# Patient Record
Sex: FEMALE | Race: WHITE | NOT HISPANIC OR LATINO | Employment: FULL TIME | URBAN - NONMETROPOLITAN AREA
[De-identification: names, ages, dates, MRNs, and addresses within clinical notes are randomized per-mention and may not be internally consistent; named-entity substitution may affect disease eponyms.]

---
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
## 2019-03-12 ENCOUNTER — HOSPITAL ENCOUNTER (OUTPATIENT)
Dept: LAB | Facility: MEDICAL CENTER | Age: 19
End: 2019-03-12
Attending: OBSTETRICS & GYNECOLOGY
Payer: COMMERCIAL

## 2019-03-12 LAB — PROGEST SERPL-MCNC: 14.67 NG/ML

## 2019-03-12 PROCEDURE — 36415 COLL VENOUS BLD VENIPUNCTURE: CPT

## 2019-03-12 PROCEDURE — 84144 ASSAY OF PROGESTERONE: CPT

## 2019-11-02 ENCOUNTER — NON-PROVIDER VISIT (OUTPATIENT)
Dept: URGENT CARE | Facility: PHYSICIAN GROUP | Age: 19
End: 2019-11-02

## 2019-11-02 DIAGNOSIS — Z11.1 ENCOUNTER FOR PPD TEST: ICD-10-CM

## 2019-11-02 PROCEDURE — 86580 TB INTRADERMAL TEST: CPT | Performed by: FAMILY MEDICINE

## 2019-11-04 ENCOUNTER — NON-PROVIDER VISIT (OUTPATIENT)
Dept: URGENT CARE | Facility: PHYSICIAN GROUP | Age: 19
End: 2019-11-04

## 2019-11-04 LAB — TB WHEAL 3D P 5 TU DIAM: 0 MM

## 2020-02-12 ENCOUNTER — NON-PROVIDER VISIT (OUTPATIENT)
Dept: URGENT CARE | Facility: PHYSICIAN GROUP | Age: 20
End: 2020-02-12

## 2020-02-12 DIAGNOSIS — Z11.1 ENCOUNTER FOR PPD TEST: ICD-10-CM

## 2020-02-12 PROCEDURE — 86580 TB INTRADERMAL TEST: CPT | Performed by: PHYSICIAN ASSISTANT

## 2020-02-12 NOTE — NON-PROVIDER
Juvenal Jin is a 19 y.o. female here for a non-provider visit for PPD placement -- Step 1 of 1    Reason for PPD:  work requirement    1. TB evaluation questionnaire completed by patient? Yes      -  If any answers marked yes did you contact a provider prior to placing? No  2.  Patient notified to return to clinic for reading on: 2/14-2/15  3.  PPD Placement documentation completed on TB evaluation questionnaire? Yes  4.  Location of TB evaluation questionnaire filed: epic

## 2020-02-14 ENCOUNTER — NON-PROVIDER VISIT (OUTPATIENT)
Dept: URGENT CARE | Facility: PHYSICIAN GROUP | Age: 20
End: 2020-02-14

## 2020-02-14 LAB — TB WHEAL 3D P 5 TU DIAM: 0 MM

## 2021-05-10 ENCOUNTER — OFFICE VISIT (OUTPATIENT)
Dept: URGENT CARE | Facility: PHYSICIAN GROUP | Age: 21
End: 2021-05-10
Payer: MEDICAID

## 2021-05-10 VITALS
WEIGHT: 182 LBS | SYSTOLIC BLOOD PRESSURE: 114 MMHG | TEMPERATURE: 98.6 F | HEART RATE: 60 BPM | BODY MASS INDEX: 31.07 KG/M2 | DIASTOLIC BLOOD PRESSURE: 60 MMHG | HEIGHT: 64 IN | RESPIRATION RATE: 12 BRPM | OXYGEN SATURATION: 98 %

## 2021-05-10 DIAGNOSIS — H10.9 BACTERIAL CONJUNCTIVITIS OF RIGHT EYE: ICD-10-CM

## 2021-05-10 PROCEDURE — 99203 OFFICE O/P NEW LOW 30 MIN: CPT | Performed by: NURSE PRACTITIONER

## 2021-05-10 RX ORDER — POLYMYXIN B SULFATE AND TRIMETHOPRIM 1; 10000 MG/ML; [USP'U]/ML
1 SOLUTION OPHTHALMIC EVERY 4 HOURS
Qty: 10 ML | Refills: 0 | Status: SHIPPED | OUTPATIENT
Start: 2021-05-10

## 2021-05-10 ASSESSMENT — ENCOUNTER SYMPTOMS
DIZZINESS: 0
DOUBLE VISION: 0
HEADACHES: 0
BLURRED VISION: 0
VOMITING: 0
NAUSEA: 0

## 2021-05-10 ASSESSMENT — VISUAL ACUITY: OU: 1

## 2021-05-10 NOTE — PROGRESS NOTES
"Subjective:   Juvenal Jin is a 21 y.o. female who presents for Eye Problem (right)      HPI  21 year old female in urgent care for new onset right eye pain, drainage since this morning. She denies vision changes, dizziness, nausea or vomiting.  She is using cool water compresses to clean out her eye. Patient is also 33 weeks pregnant    Review of Systems   Eyes: Negative for blurred vision and double vision.   Gastrointestinal: Negative for nausea and vomiting.   Neurological: Negative for dizziness and headaches.   All other systems reviewed and are negative.      Patient Active Problem List   Diagnosis   • Nausea     History reviewed. No pertinent surgical history.  Social History     Tobacco Use   • Smoking status: Current Some Day Smoker   • Smokeless tobacco: Never Used   Substance Use Topics   • Alcohol use: No     Alcohol/week: 0.0 oz   • Drug use: No      History reviewed. No pertinent family history.   (Allergies, Medications, & Tobacco/Substance Use were reconciled by the Medical Assistant and reviewed by myself. The family history is prepopulated)     Objective:     /60 (BP Location: Right arm, Patient Position: Sitting, BP Cuff Size: Adult)   Pulse 60   Temp 37 °C (98.6 °F) (Temporal)   Resp 12   Ht 1.626 m (5' 4\")   Wt 82.6 kg (182 lb)   SpO2 98%   BMI 31.24 kg/m²     Physical Exam  Vitals reviewed.   Constitutional:       Appearance: Normal appearance.   Eyes:      General: Vision grossly intact. Gaze aligned appropriately.      Conjunctiva/sclera:      Right eye: Right conjunctiva is injected. Exudate present.   Pulmonary:      Effort: Pulmonary effort is normal.   Skin:     General: Skin is warm.   Neurological:      Mental Status: She is alert and oriented to person, place, and time.   Psychiatric:         Mood and Affect: Mood normal.         Behavior: Behavior normal.         Thought Content: Thought content normal.         Judgment: Judgment normal.         Assessment/Plan:     1. " Bacterial conjunctivitis of right eye  polymixin-trimethoprim (POLYTRIM) 31518-7.1 UNIT/ML-% Solution     Discussed physical examination findings as well as patient presentation, length patient symptoms is consistent with bacterial conjunctivitis and will be treated with antibiotic eyedrops at the right eye.  Advised to continue to use cool water compresses avoid eye make-up and increase hand hygiene.      Differential diagnosis, natural history, supportive care, and indications for immediate follow-up discussed.    Advised the patient to follow-up with the primary care physician for recheck, reevaluation, and consideration of further management.    Please note that this dictation was created using voice recognition software. I have made a reasonable attempt to correct obvious errors, but I expect that there are errors of grammar and possibly content that I did not discover before finalizing the note.    This note was electronically signed THONY Camp

## 2021-09-02 ENCOUNTER — HOSPITAL ENCOUNTER (OUTPATIENT)
Facility: MEDICAL CENTER | Age: 21
End: 2021-09-02
Attending: PHYSICIAN ASSISTANT
Payer: COMMERCIAL

## 2021-09-02 ENCOUNTER — OFFICE VISIT (OUTPATIENT)
Dept: URGENT CARE | Facility: CLINIC | Age: 21
End: 2021-09-02

## 2021-09-02 VITALS
HEART RATE: 74 BPM | SYSTOLIC BLOOD PRESSURE: 114 MMHG | BODY MASS INDEX: 29.59 KG/M2 | HEIGHT: 63 IN | RESPIRATION RATE: 18 BRPM | DIASTOLIC BLOOD PRESSURE: 78 MMHG | WEIGHT: 167 LBS | OXYGEN SATURATION: 98 % | TEMPERATURE: 98.3 F

## 2021-09-02 DIAGNOSIS — Z02.89 ENCOUNTER FOR PHYSICAL EXAMINATION RELATED TO EMPLOYMENT: ICD-10-CM

## 2021-09-02 DIAGNOSIS — Z02.1 PRE-EMPLOYMENT DRUG SCREENING: ICD-10-CM

## 2021-09-02 DIAGNOSIS — Z02.89 ENCOUNTER FOR OCCUPATIONAL HEALTH ASSESSMENT: ICD-10-CM

## 2021-09-02 LAB
AMP AMPHETAMINE: NORMAL
COC COCAINE: NORMAL
INT CON NEG: NORMAL
INT CON POS: NORMAL
MET METHAMPHETAMINES: NORMAL
OPI OPIATES: NORMAL
PCP PHENCYCLIDINE: NORMAL
POC DRUG COMMENT 753798-OCCUPATIONAL HEALTH: NEGATIVE
THC: NORMAL

## 2021-09-02 PROCEDURE — 36415 COLL VENOUS BLD VENIPUNCTURE: CPT | Performed by: PHYSICIAN ASSISTANT

## 2021-09-02 PROCEDURE — 99000 SPECIMEN HANDLING OFFICE-LAB: CPT | Performed by: PHYSICIAN ASSISTANT

## 2021-09-02 PROCEDURE — 80305 DRUG TEST PRSMV DIR OPT OBS: CPT | Performed by: PHYSICIAN ASSISTANT

## 2021-09-02 PROCEDURE — 86480 TB TEST CELL IMMUN MEASURE: CPT | Performed by: PHYSICIAN ASSISTANT

## 2021-09-02 PROCEDURE — 8915 PR COMPREHENSIVE PHYSICAL: Performed by: PHYSICIAN ASSISTANT

## 2021-09-07 LAB
GAMMA INTERFERON BACKGROUND BLD IA-ACNC: 0.31 IU/ML
M TB IFN-G BLD-IMP: NEGATIVE
M TB IFN-G CD4+ BCKGRND COR BLD-ACNC: -0.05 IU/ML
MITOGEN IGNF BCKGRD COR BLD-ACNC: >10 IU/ML
QFT TB2 - NIL TBQ2: -0.09 IU/ML

## 2023-03-09 ENCOUNTER — OFFICE VISIT (OUTPATIENT)
Dept: URGENT CARE | Facility: PHYSICIAN GROUP | Age: 23
End: 2023-03-09
Payer: COMMERCIAL

## 2023-03-09 VITALS
BODY MASS INDEX: 27.86 KG/M2 | HEART RATE: 101 BPM | RESPIRATION RATE: 18 BRPM | HEIGHT: 64 IN | DIASTOLIC BLOOD PRESSURE: 78 MMHG | WEIGHT: 163.2 LBS | OXYGEN SATURATION: 98 % | SYSTOLIC BLOOD PRESSURE: 118 MMHG | TEMPERATURE: 98.2 F

## 2023-03-09 DIAGNOSIS — R11.2 NAUSEA AND VOMITING, UNSPECIFIED VOMITING TYPE: ICD-10-CM

## 2023-03-09 PROCEDURE — 99213 OFFICE O/P EST LOW 20 MIN: CPT | Performed by: NURSE PRACTITIONER

## 2023-03-09 RX ORDER — ONDANSETRON 4 MG/1
4 TABLET, ORALLY DISINTEGRATING ORAL EVERY 6 HOURS PRN
Qty: 15 TABLET | Refills: 0 | Status: SHIPPED | OUTPATIENT
Start: 2023-03-09

## 2023-03-09 RX ORDER — ONDANSETRON 4 MG/1
4 TABLET, ORALLY DISINTEGRATING ORAL EVERY 6 HOURS PRN
Qty: 15 TABLET | Refills: 0 | Status: SHIPPED | OUTPATIENT
Start: 2023-03-09 | End: 2023-03-09

## 2023-03-09 ASSESSMENT — ENCOUNTER SYMPTOMS
NUMBER OF EPISODES OF EMESIS TODAY: 1
NAUSEA: 1
MUSCULOSKELETAL NEGATIVE: 1
FEVER: 0
VOMITING: 1
EYES NEGATIVE: 1
CARDIOVASCULAR NEGATIVE: 1
RESPIRATORY NEGATIVE: 1
PSYCHIATRIC NEGATIVE: 1
NEUROLOGICAL NEGATIVE: 1
CHILLS: 1

## 2023-03-09 NOTE — LETTER
March 9, 2023       Patient: Juvenal Jin   YOB: 2000   Date of Visit: 3/9/2023         To Whom It May Concern:    In my medical opinion, I recommend that Juvenal Jin be excused from work 3/8/2023 through 3/11/2023 due to illness.     If you have any questions or concerns, please don't hesitate to call [phone]          Sincerely,          ALBINO VelezRAidaN.  Electronically Signed

## 2023-03-09 NOTE — PROGRESS NOTES
"Subjective:   Juvenal Jin is a 23 y.o. female who presents for Nausea, Emesis, and Dizziness (Started last night around 5 pm tom then by 9 pm pt states she started vomiting.)      Nausea  This is a new problem. The current episode started yesterday. The problem occurs constantly. The problem has been unchanged. Associated symptoms include chills, nausea and vomiting. Pertinent negatives include no fever. The symptoms are aggravated by drinking and eating. She has tried rest and sleep for the symptoms.   Emesis  This is a new problem. The current episode started yesterday. The problem occurs constantly. The problem has been unchanged. Associated symptoms include chills, nausea and vomiting. Pertinent negatives include no fever. The symptoms are aggravated by drinking and eating. She has tried nothing for the symptoms. The treatment provided mild relief.     Review of Systems   Constitutional:  Positive for chills. Negative for fever.   HENT: Negative.     Eyes: Negative.    Respiratory: Negative.     Cardiovascular: Negative.    Gastrointestinal:  Positive for nausea and vomiting.   Genitourinary: Negative.    Musculoskeletal: Negative.    Skin: Negative.    Neurological: Negative.    Psychiatric/Behavioral: Negative.       Medications, Allergies, and current problem list reviewed today in Epic.     Objective:     /78   Pulse (!) 101   Temp 36.8 °C (98.2 °F) (Temporal)   Resp 18   Ht 1.626 m (5' 4\")   Wt 74 kg (163 lb 3.2 oz)   SpO2 98%     Physical Exam  Vitals reviewed.   Constitutional:       Appearance: Normal appearance.   HENT:      Head: Normocephalic and atraumatic.      Nose: Nose normal.      Mouth/Throat:      Mouth: Mucous membranes are moist.      Pharynx: Oropharynx is clear.   Eyes:      Extraocular Movements: Extraocular movements intact.      Conjunctiva/sclera: Conjunctivae normal.      Pupils: Pupils are equal, round, and reactive to light.   Cardiovascular:      Rate and Rhythm: " "Normal rate and regular rhythm.   Pulmonary:      Effort: Pulmonary effort is normal.      Breath sounds: Normal breath sounds.   Abdominal:      General: Abdomen is flat.      Palpations: Abdomen is soft.   Musculoskeletal:         General: Normal range of motion.      Cervical back: Normal range of motion and neck supple.   Skin:     General: Skin is warm and dry.      Capillary Refill: Capillary refill takes less than 2 seconds.   Neurological:      General: No focal deficit present.      Mental Status: She is alert.   Psychiatric:         Mood and Affect: Mood normal.         Behavior: Behavior normal.       Assessment/Plan:     Diagnosis and associated orders:     1. Nausea and vomiting, unspecified vomiting type  ondansetron (ZOFRAN ODT) 4 MG TABLET DISPERSIBLE         Comments/MDM:     Gastroenteritis   Gastroenteritis is an illness of the intestines. It is sometimes called \"stomach flu\". It causes nausea, vomiting, stomach cramps, watery poop (diarrhea) and a slight fever. It is usually caused by a virus. This illness often clears up in 4-5 days. However, it can be serious when people who lose too much fluid from throwing up (vomiting) and/or diarrhea. When too much fluid is lost and has not been replaced, it is called dehydration.   HOME CARE   Wash your hands a lot.   Rest.   Drink fluids slowly. Drinking too much or too fast can cause you to throw up.   Drink \"oral rehydration fluids\" (ORS) as directed. They can be bought in a grocery store or pharmacy. Ask your doctor or pharmacist how to take the ORS if you do not understand.   Do not eat too much at once.   Avoid tobacco, alcohol and drugs that upset your stomach.   BRAT diet start eating bananas, rice, apples and dry toast   GET HELP RIGHT AWAY IF:   You become weak, dizzy, or faint.   You cannot keep fluids down.   You have a dry mouth, no tears and pee less. These are signs of dehydration.   Belly (abdominal) pain starts, feels worse, or stays in " one place.   You or your child has a fever above 102º F (38.9º C) for more than 1 day.   Diarrhea has blood or mucous in it.   You become confused.   After 5-7 days, you are still throwing up and/or having diarrhea.   MAKE SURE YOU:   Understand these instructions.   Will watch your condition.   Will get help right away if you are not doing well or get worse.   Document Released: 06/05/2009 Document Re-Released: 11/30/2009   [employer]® Patient Information ©2011 [employer].           Differential diagnosis, natural history, supportive care, and indications for immediate follow-up discussed.    Advised the patient to follow-up with the primary care physician for recheck, reevaluation, and consideration of further management.    Please note that this dictation was created using voice recognition software. I have made a reasonable attempt to correct obvious errors, but I expect that there are errors of grammar and possibly content that I did not discover before finalizing the note.    This note was electronically signed by THONY Iqbal

## 2023-04-19 ENCOUNTER — OFFICE VISIT (OUTPATIENT)
Dept: URGENT CARE | Facility: PHYSICIAN GROUP | Age: 23
End: 2023-04-19
Payer: COMMERCIAL

## 2023-04-19 VITALS
TEMPERATURE: 96.8 F | DIASTOLIC BLOOD PRESSURE: 76 MMHG | OXYGEN SATURATION: 98 % | RESPIRATION RATE: 14 BRPM | HEIGHT: 64 IN | HEART RATE: 77 BPM | BODY MASS INDEX: 27.83 KG/M2 | SYSTOLIC BLOOD PRESSURE: 112 MMHG | WEIGHT: 163 LBS

## 2023-04-19 DIAGNOSIS — J02.9 PHARYNGITIS, UNSPECIFIED ETIOLOGY: ICD-10-CM

## 2023-04-19 DIAGNOSIS — Z3A.01 LESS THAN 8 WEEKS GESTATION OF PREGNANCY: ICD-10-CM

## 2023-04-19 LAB — S PYO DNA SPEC NAA+PROBE: NOT DETECTED

## 2023-04-19 PROCEDURE — 99213 OFFICE O/P EST LOW 20 MIN: CPT

## 2023-04-19 PROCEDURE — 87651 STREP A DNA AMP PROBE: CPT

## 2023-04-19 ASSESSMENT — ENCOUNTER SYMPTOMS
HEMOPTYSIS: 0
SORE THROAT: 1
SHORTNESS OF BREATH: 0
SINUS PAIN: 0
SPUTUM PRODUCTION: 0
WHEEZING: 0
CHILLS: 0
STRIDOR: 0
COUGH: 1
FEVER: 0

## 2023-04-19 NOTE — PROGRESS NOTES
Subjective:   Juvenal Jin is a 23 y.o. female who presents for Pharyngitis (Red and swollen x 3 days )      HPI: This is a 23-year-old female who presents today for sore throat.  This is a new problem.  Patient reports developing sore throat 2 days ago.  She reports pain is 6\10.  He reports associated pain with swallowing, runny nose, and mild cough.  She has not take anything for her symptoms.  Patient does report that she is 6 weeks pregnant.  No fevers, chills, body aches.  No sick contacts.  No history of strep in the past.      Review of Systems   Constitutional:  Negative for chills, fever and malaise/fatigue.   HENT:  Positive for sore throat. Negative for congestion, ear discharge, ear pain and sinus pain.    Respiratory:  Positive for cough. Negative for hemoptysis, sputum production, shortness of breath, wheezing and stridor.    All other systems reviewed and are negative.    Medications:    Current Outpatient Medications on File Prior to Visit   Medication Sig Dispense Refill    ondansetron (ZOFRAN ODT) 4 MG TABLET DISPERSIBLE Take 1 Tablet by mouth every 6 hours as needed for Nausea/Vomiting for up to 15 doses. (Patient not taking: Reported on 4/19/2023) 15 Tablet 0    polymixin-trimethoprim (POLYTRIM) 62381-7.1 UNIT/ML-% Solution Administer 1 Drop into the right eye every 4 hours. (Patient not taking: Reported on 9/2/2021) 10 mL 0    calcium carbonate (TUMS) 500 MG Chew Tab Take 500 mg by mouth every day. (Patient not taking: Reported on 9/2/2021)       No current facility-administered medications on file prior to visit.        Allergies:   Patient has no known allergies.    Problem List:   Patient Active Problem List   Diagnosis    Nausea        Surgical History:  No past surgical history on file.    Past Social Hx:   Social History     Tobacco Use    Smoking status: Some Days    Smokeless tobacco: Never   Substance Use Topics    Alcohol use: No     Alcohol/week: 0.0 oz    Drug use: No     "      Problem list, medications, and allergies reviewed by myself today in Epic.     Objective:     /76   Pulse 77   Temp 36 °C (96.8 °F) (Temporal)   Resp 14   Ht 1.626 m (5' 4\")   Wt 73.9 kg (163 lb)   SpO2 98%   BMI 27.98 kg/m²     Physical Exam  Vitals and nursing note reviewed.   Constitutional:       General: She is not in acute distress.     Appearance: Normal appearance. She is normal weight. She is not ill-appearing, toxic-appearing or diaphoretic.   HENT:      Head: Normocephalic and atraumatic.      Right Ear: Tympanic membrane, ear canal and external ear normal. There is no impacted cerumen.      Left Ear: Tympanic membrane, ear canal and external ear normal. There is no impacted cerumen.      Nose: Nose normal. No congestion or rhinorrhea.      Mouth/Throat:      Mouth: Mucous membranes are moist.      Pharynx: Oropharynx is clear. Uvula midline. Posterior oropharyngeal erythema present. No oropharyngeal exudate.      Tonsils: No tonsillar exudate. 1+ on the right. 1+ on the left.   Cardiovascular:      Rate and Rhythm: Normal rate and regular rhythm.      Pulses: Normal pulses.      Heart sounds: Normal heart sounds. No murmur heard.    No friction rub. No gallop.   Pulmonary:      Effort: Pulmonary effort is normal. No respiratory distress.      Breath sounds: Normal breath sounds. No stridor. No wheezing, rhonchi or rales.   Chest:      Chest wall: No tenderness.   Musculoskeletal:      Cervical back: Neck supple. No tenderness.   Lymphadenopathy:      Cervical: Cervical adenopathy present.   Skin:     General: Skin is warm and dry.      Capillary Refill: Capillary refill takes less than 2 seconds.   Neurological:      General: No focal deficit present.      Mental Status: She is alert and oriented to person, place, and time. Mental status is at baseline.      Cranial Nerves: No cranial nerve deficit.      Motor: No weakness.      Gait: Gait normal.   Psychiatric:         Mood and " Affect: Mood normal.         Behavior: Behavior normal.         Thought Content: Thought content normal.         Judgment: Judgment normal.       Assessment/Plan:     Diagnosis and associated orders:   1. Pharyngitis, unspecified etiology  POCT GROUP A STREP, PCR      2. Less than 8 weeks gestation of pregnancy           Results for orders placed or performed in visit on 04/19/23   POCT GROUP A STREP, PCR   Result Value Ref Range    POC Group A Strep, PCR Not Detected Not Detected, Invalid          Comments/MDM:   Pt is clinically stable at today's acute urgent care visit.  No acute distress noted. Appropriate for outpatient management at this time.     Acute problem.  Strep testing by PCR is negative.  These results were released to patient's MyChart.  I have discussed with patient that symptoms are likely viral in etiology.  I recommended warm salt water gargles and warm fluids with honey. Patient is to return to UC or go to ER for any new or worsening signs or symptoms, and follow with with PCP for recheck. Patient is agreeable with plan of care and verbalizes good understanding.           Discussed DDx, management options (risks,benefits, and alternatives to planned treatment), natural progression and supportive care.  Expressed understanding and the treatment plan was agreed upon. Questions were encouraged and answered   Return to urgent care prn if new or worsening sx or if there is no improvement in condition prn.    Educated in Red flags and indications to immediately call 911 or present to the Emergency Department.   Advised the patient to follow-up with the primary care physician for recheck, reevaluation, and consideration of further management.    I personally reviewed prior external notes and test results pertinent to today's visit.  I have independently reviewed and interpreted all diagnostics ordered during this urgent care acute visit.     Please note that this dictation was created using voice  recognition software. I have made a reasonable attempt to correct obvious errors, but I expect that there are errors of grammar and possibly content that I did not discover before finalizing the note.    This note was electronically signed by THONY Aly